# Patient Record
Sex: FEMALE | Race: WHITE | NOT HISPANIC OR LATINO | Employment: STUDENT | URBAN - NONMETROPOLITAN AREA
[De-identification: names, ages, dates, MRNs, and addresses within clinical notes are randomized per-mention and may not be internally consistent; named-entity substitution may affect disease eponyms.]

---
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
## 2018-01-31 ENCOUNTER — APPOINTMENT (OUTPATIENT)
Dept: GENERAL RADIOLOGY | Facility: HOSPITAL | Age: 18
End: 2018-01-31

## 2018-01-31 ENCOUNTER — HOSPITAL ENCOUNTER (EMERGENCY)
Facility: HOSPITAL | Age: 18
Discharge: HOME | End: 2018-02-01
Attending: EMERGENCY MEDICINE | Admitting: EMERGENCY MEDICINE

## 2018-01-31 ENCOUNTER — APPOINTMENT (OUTPATIENT)
Dept: CT IMAGING | Facility: HOSPITAL | Age: 18
End: 2018-01-31

## 2018-01-31 DIAGNOSIS — S46.911A ELBOW STRAIN, RIGHT, INITIAL ENCOUNTER: ICD-10-CM

## 2018-01-31 DIAGNOSIS — M23.92 INTERNAL DERANGEMENT OF BOTH KNEES: Primary | ICD-10-CM

## 2018-01-31 DIAGNOSIS — N30.00 ACUTE CYSTITIS WITHOUT HEMATURIA: ICD-10-CM

## 2018-01-31 DIAGNOSIS — M23.91 INTERNAL DERANGEMENT OF BOTH KNEES: Primary | ICD-10-CM

## 2018-01-31 DIAGNOSIS — S46.912A STRAIN OF LEFT SHOULDER, INITIAL ENCOUNTER: ICD-10-CM

## 2018-01-31 LAB
ALBUMIN SERPL-MCNC: 4.6 G/DL (ref 3.5–5)
ALBUMIN/GLOB SERPL: 1.6 G/DL (ref 1.5–2.5)
ALP SERPL-CCNC: 57 U/L (ref 35–104)
ALT SERPL W P-5'-P-CCNC: 21 U/L (ref 10–36)
AMYLASE SERPL-CCNC: 41 U/L (ref 28–100)
ANION GAP SERPL CALCULATED.3IONS-SCNC: 10 MMOL/L (ref 3.6–11.2)
AST SERPL-CCNC: 24 U/L (ref 10–30)
B-HCG UR QL: NEGATIVE
BASOPHILS # BLD AUTO: 0.03 10*3/MM3 (ref 0–0.3)
BASOPHILS NFR BLD AUTO: 0.3 % (ref 0–2)
BILIRUB SERPL-MCNC: 0.6 MG/DL (ref 0.2–1.8)
BILIRUB UR QL STRIP: ABNORMAL
BUN BLD-MCNC: 9 MG/DL (ref 7–21)
BUN/CREAT SERPL: 13 (ref 7–25)
CALCIUM SPEC-SCNC: 9.7 MG/DL (ref 7.7–10)
CHLORIDE SERPL-SCNC: 104 MMOL/L (ref 99–112)
CLARITY UR: ABNORMAL
CO2 SERPL-SCNC: 25 MMOL/L (ref 24.3–31.9)
COLOR UR: ABNORMAL
CREAT BLD-MCNC: 0.69 MG/DL (ref 0.43–1.29)
DEPRECATED RDW RBC AUTO: 38.8 FL (ref 37–54)
EOSINOPHIL # BLD AUTO: 0.06 10*3/MM3 (ref 0–0.7)
EOSINOPHIL NFR BLD AUTO: 0.6 % (ref 0–5)
ERYTHROCYTE [DISTWIDTH] IN BLOOD BY AUTOMATED COUNT: 12.9 % (ref 11.5–14.5)
GFR SERPL CREATININE-BSD FRML MDRD: 110 ML/MIN/1.73
GLOBULIN UR ELPH-MCNC: 2.8 GM/DL
GLUCOSE BLD-MCNC: 83 MG/DL (ref 70–110)
GLUCOSE UR STRIP-MCNC: NEGATIVE MG/DL
HCT VFR BLD AUTO: 39.8 % (ref 37–47)
HGB BLD-MCNC: 13.6 G/DL (ref 12–16)
HGB UR QL STRIP.AUTO: ABNORMAL
IMM GRANULOCYTES # BLD: 0.01 10*3/MM3 (ref 0–0.03)
IMM GRANULOCYTES NFR BLD: 0.1 % (ref 0–0.5)
KETONES UR QL STRIP: ABNORMAL
LEUKOCYTE ESTERASE UR QL STRIP.AUTO: ABNORMAL
LIPASE SERPL-CCNC: 25 U/L (ref 13–60)
LYMPHOCYTES # BLD AUTO: 2.5 10*3/MM3 (ref 1–3)
LYMPHOCYTES NFR BLD AUTO: 25 % (ref 21–51)
MCH RBC QN AUTO: 28.9 PG (ref 27–33)
MCHC RBC AUTO-ENTMCNC: 34.2 G/DL (ref 33–37)
MCV RBC AUTO: 84.5 FL (ref 80–94)
MONOCYTES # BLD AUTO: 0.77 10*3/MM3 (ref 0.1–0.9)
MONOCYTES NFR BLD AUTO: 7.7 % (ref 0–10)
NEUTROPHILS # BLD AUTO: 6.63 10*3/MM3 (ref 1.4–6.5)
NEUTROPHILS NFR BLD AUTO: 66.3 % (ref 30–70)
NITRITE UR QL STRIP: NEGATIVE
OSMOLALITY SERPL CALC.SUM OF ELEC: 275.4 MOSM/KG (ref 273–305)
PH UR STRIP.AUTO: <=5 [PH] (ref 5–8)
PLATELET # BLD AUTO: 337 10*3/MM3 (ref 130–400)
PMV BLD AUTO: 9.5 FL (ref 6–10)
POTASSIUM BLD-SCNC: 4 MMOL/L (ref 3.5–5.3)
PROT SERPL-MCNC: 7.4 G/DL (ref 6–8)
PROT UR QL STRIP: ABNORMAL
RBC # BLD AUTO: 4.71 10*6/MM3 (ref 4.2–5.4)
SODIUM BLD-SCNC: 139 MMOL/L (ref 135–153)
SP GR UR STRIP: >1.03 (ref 1–1.03)
UROBILINOGEN UR QL STRIP: ABNORMAL
WBC NRBC COR # BLD: 10 10*3/MM3 (ref 4.5–12.5)

## 2018-01-31 PROCEDURE — 81001 URINALYSIS AUTO W/SCOPE: CPT | Performed by: PHYSICIAN ASSISTANT

## 2018-01-31 PROCEDURE — 73080 X-RAY EXAM OF ELBOW: CPT | Performed by: RADIOLOGY

## 2018-01-31 PROCEDURE — 73564 X-RAY EXAM KNEE 4 OR MORE: CPT | Performed by: RADIOLOGY

## 2018-01-31 PROCEDURE — 73030 X-RAY EXAM OF SHOULDER: CPT

## 2018-01-31 PROCEDURE — 73080 X-RAY EXAM OF ELBOW: CPT

## 2018-01-31 PROCEDURE — 87086 URINE CULTURE/COLONY COUNT: CPT | Performed by: PHYSICIAN ASSISTANT

## 2018-01-31 PROCEDURE — 73030 X-RAY EXAM OF SHOULDER: CPT | Performed by: RADIOLOGY

## 2018-01-31 PROCEDURE — 85025 COMPLETE CBC W/AUTO DIFF WBC: CPT | Performed by: PHYSICIAN ASSISTANT

## 2018-01-31 PROCEDURE — 74177 CT ABD & PELVIS W/CONTRAST: CPT | Performed by: RADIOLOGY

## 2018-01-31 PROCEDURE — 81025 URINE PREGNANCY TEST: CPT | Performed by: PHYSICIAN ASSISTANT

## 2018-01-31 PROCEDURE — 82150 ASSAY OF AMYLASE: CPT | Performed by: PHYSICIAN ASSISTANT

## 2018-01-31 PROCEDURE — 73564 X-RAY EXAM KNEE 4 OR MORE: CPT

## 2018-01-31 PROCEDURE — 80053 COMPREHEN METABOLIC PANEL: CPT | Performed by: PHYSICIAN ASSISTANT

## 2018-01-31 PROCEDURE — 99284 EMERGENCY DEPT VISIT MOD MDM: CPT

## 2018-01-31 PROCEDURE — 83690 ASSAY OF LIPASE: CPT | Performed by: PHYSICIAN ASSISTANT

## 2018-01-31 PROCEDURE — 74177 CT ABD & PELVIS W/CONTRAST: CPT

## 2018-01-31 RX ORDER — SODIUM CHLORIDE 0.9 % (FLUSH) 0.9 %
10 SYRINGE (ML) INJECTION AS NEEDED
Status: DISCONTINUED | OUTPATIENT
Start: 2018-01-31 | End: 2018-02-01 | Stop reason: HOSPADM

## 2018-02-01 VITALS
OXYGEN SATURATION: 98 % | BODY MASS INDEX: 23.32 KG/M2 | RESPIRATION RATE: 18 BRPM | TEMPERATURE: 97.5 F | HEART RATE: 74 BPM | SYSTOLIC BLOOD PRESSURE: 115 MMHG | DIASTOLIC BLOOD PRESSURE: 76 MMHG | WEIGHT: 140 LBS | HEIGHT: 65 IN

## 2018-02-01 LAB
AMORPH URATE CRY URNS QL MICRO: ABNORMAL /HPF
BACTERIA UR QL AUTO: ABNORMAL /HPF
HYALINE CASTS UR QL AUTO: ABNORMAL /LPF
MUCOUS THREADS URNS QL MICRO: ABNORMAL /HPF
RBC # UR: ABNORMAL /HPF
REF LAB TEST METHOD: ABNORMAL
SQUAMOUS #/AREA URNS HPF: ABNORMAL /HPF
WBC UR QL AUTO: ABNORMAL /HPF

## 2018-02-01 PROCEDURE — 0 IOPAMIDOL 61 % SOLUTION: Performed by: EMERGENCY MEDICINE

## 2018-02-01 PROCEDURE — 96374 THER/PROPH/DIAG INJ IV PUSH: CPT

## 2018-02-01 PROCEDURE — 25010000002 KETOROLAC TROMETHAMINE PER 15 MG: Performed by: PHYSICIAN ASSISTANT

## 2018-02-01 RX ORDER — KETOROLAC TROMETHAMINE 30 MG/ML
30 INJECTION, SOLUTION INTRAMUSCULAR; INTRAVENOUS ONCE
Status: COMPLETED | OUTPATIENT
Start: 2018-02-01 | End: 2018-02-01

## 2018-02-01 RX ORDER — KETOROLAC TROMETHAMINE 30 MG/ML
60 INJECTION, SOLUTION INTRAMUSCULAR; INTRAVENOUS ONCE
Status: DISCONTINUED | OUTPATIENT
Start: 2018-02-01 | End: 2018-02-01

## 2018-02-01 RX ORDER — SULFAMETHOXAZOLE AND TRIMETHOPRIM 800; 160 MG/1; MG/1
1 TABLET ORAL 2 TIMES DAILY
Qty: 10 TABLET | Refills: 0 | Status: SHIPPED | OUTPATIENT
Start: 2018-02-01 | End: 2018-02-06

## 2018-02-01 RX ORDER — SULFAMETHOXAZOLE AND TRIMETHOPRIM 800; 160 MG/1; MG/1
1 TABLET ORAL ONCE
Status: COMPLETED | OUTPATIENT
Start: 2018-02-01 | End: 2018-02-01

## 2018-02-01 RX ADMIN — KETOROLAC TROMETHAMINE 30 MG: 30 INJECTION, SOLUTION INTRAMUSCULAR; INTRAVENOUS at 01:34

## 2018-02-01 RX ADMIN — IOPAMIDOL 95 ML: 612 INJECTION, SOLUTION INTRAVENOUS at 00:08

## 2018-02-01 RX ADMIN — SULFAMETHOXAZOLE AND TRIMETHOPRIM 160 MG: 800; 160 TABLET ORAL at 01:02
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
## 2018-02-01 NOTE — DISCHARGE INSTRUCTIONS
Please start and finish bactrim. Please see PCP in 3 days. Please use knee immobilizer and crutches as needed as well as rest, ice, elevation and ibuprofen. Please follow up with ortho provided. Please return to ER if symptoms worsen.     Muscle Strain  A muscle strain is an injury that occurs when a muscle is stretched beyond its normal length. Usually a small number of muscle fibers are torn when this happens. Muscle strain is rated in degrees. First-degree strains have the least amount of muscle fiber tearing and pain. Second-degree and third-degree strains have increasingly more tearing and pain.  Usually, recovery from muscle strain takes 1-2 weeks. Complete healing takes 5-6 weeks.  What are the causes?  Muscle strain happens when a sudden, violent force placed on a muscle stretches it too far. This may occur with lifting, sports, or a fall.  What increases the risk?  Muscle strain is especially common in athletes.  What are the signs or symptoms?  At the site of the muscle strain, there may be:  · Pain.  · Bruising.  · Swelling.  · Difficulty using the muscle due to pain or lack of normal function.  How is this diagnosed?  Your health care provider will perform a physical exam and ask about your medical history.  How is this treated?  Often, the best treatment for a muscle strain is resting, icing, and applying cold compresses to the injured area.  Follow these instructions at home:  · Use the PRICE method of treatment to promote muscle healing during the first 2-3 days after your injury. The PRICE method involves:  ¨ Protecting the muscle from being injured again.  ¨ Restricting your activity and resting the injured body part.  ¨ Icing your injury. To do this, put ice in a plastic bag. Place a towel between your skin and the bag. Then, apply the ice and leave it on from 15-20 minutes each hour. After the third day, switch to moist heat packs.  ¨ Apply compression to the injured area with a splint or elastic  bandage. Be careful not to wrap it too tightly. This may interfere with blood circulation or increase swelling.  ¨ Elevate the injured body part above the level of your heart as often as you can.  · Only take over-the-counter or prescription medicines for pain, discomfort, or fever as directed by your health care provider.  · Warming up prior to exercise helps to prevent future muscle strains.  Contact a health care provider if:  · You have increasing pain or swelling in the injured area.  · You have numbness, tingling, or a significant loss of strength in the injured area.  This information is not intended to replace advice given to you by your health care provider. Make sure you discuss any questions you have with your health care provider.  Document Released: 12/18/2006 Document Revised: 05/25/2017 Document Reviewed: 07/17/2014  [employer] Interactive Patient Education © 2017 [employer]      Hypertension  Hypertension, commonly called high blood pressure, is when the force of blood pumping through your arteries is too strong. Your arteries are the blood vessels that carry blood from your heart throughout your body. A blood pressure reading consists of a higher number over a lower number, such as 110/72. The higher number (systolic) is the pressure inside your arteries when your heart pumps. The lower number (diastolic) is the pressure inside your arteries when your heart relaxes. Ideally you want your blood pressure below 120/80.  Hypertension forces your heart to work harder to pump blood. Your arteries may become narrow or stiff. Having untreated or uncontrolled hypertension can cause heart attack, stroke, kidney disease, and other problems.  What increases the risk?  Some risk factors for high blood pressure are controllable. Others are not.  Risk factors you cannot control include:  · Race. You may be at higher risk if you are .  · Age. Risk increases with age.  · Gender. Men are at higher  risk than women before age 45 years. After age 65, women are at higher risk than men.  Risk factors you can control include:  · Not getting enough exercise or physical activity.  · Being overweight.  · Getting too much fat, sugar, calories, or salt in your diet.  · Drinking too much alcohol.  What are the signs or symptoms?  Hypertension does not usually cause signs or symptoms. Extremely high blood pressure (hypertensive crisis) may cause headache, anxiety, shortness of breath, and nosebleed.  How is this diagnosed?  To check if you have hypertension, your health care provider will measure your blood pressure while you are seated, with your arm held at the level of your heart. It should be measured at least twice using the same arm. Certain conditions can cause a difference in blood pressure between your right and left arms. A blood pressure reading that is higher than normal on one occasion does not mean that you need treatment. If it is not clear whether you have high blood pressure, you may be asked to return on a different day to have your blood pressure checked again. Or, you may be asked to monitor your blood pressure at home for 1 or more weeks.  How is this treated?  Treating high blood pressure includes making lifestyle changes and possibly taking medicine. Living a healthy lifestyle can help lower high blood pressure. You may need to change some of your habits.  Lifestyle changes may include:  · Following the DASH diet. This diet is high in fruits, vegetables, and whole grains. It is low in salt, red meat, and added sugars.  · Keep your sodium intake below 2,300 mg per day.  · Getting at least 30-45 minutes of aerobic exercise at least 4 times per week.  · Losing weight if necessary.  · Not smoking.  · Limiting alcoholic beverages.  · Learning ways to reduce stress.  Your health care provider may prescribe medicine if lifestyle changes are not enough to get your blood pressure under control, and if one of  the following is true:  · You are 18-59 years of age and your systolic blood pressure is above 140.  · You are 60 years of age or older, and your systolic blood pressure is above 150.  · Your diastolic blood pressure is above 90.  · You have diabetes, and your systolic blood pressure is over 140 or your diastolic blood pressure is over 90.  · You have kidney disease and your blood pressure is above 140/90.  · You have heart disease and your blood pressure is above 140/90.  Your personal target blood pressure may vary depending on your medical conditions, your age, and other factors.  Follow these instructions at home:  · Have your blood pressure rechecked as directed by your health care provider.  · Take medicines only as directed by your health care provider. Follow the directions carefully. Blood pressure medicines must be taken as prescribed. The medicine does not work as well when you skip doses. Skipping doses also puts you at risk for problems.  · Do not smoke.  · Monitor your blood pressure at home as directed by your health care provider.  Contact a health care provider if:  · You think you are having a reaction to medicines taken.  · You have recurrent headaches or feel dizzy.  · You have swelling in your ankles.  · You have trouble with your vision.  Get help right away if:  · You develop a severe headache or confusion.  · You have unusual weakness, numbness, or feel faint.  · You have severe chest or abdominal pain.  · You vomit repeatedly.  · You have trouble breathing.  This information is not intended to replace advice given to you by your health care provider. Make sure you discuss any questions you have with your health care provider.  Document Released: 12/18/2006 Document Revised: 05/25/2017 Document Reviewed: 10/10/2014  [employer] Interactive Patient Education © 2017 [employer]

## 2018-02-03 LAB — BACTERIA SPEC AEROBE CULT: NORMAL

## 2018-07-06 ENCOUNTER — DOCUMENTATION (OUTPATIENT)
Dept: PSYCHIATRY | Facility: CLINIC | Age: 18
End: 2018-07-06

## 2018-07-06 NOTE — PROGRESS NOTES
DISCHARGE SUMMARY     NAME:     Shanta Cuellar  MEDICAL RECORD NUMBER: [id number]  VISIT NUMBER:     ATTENDING PHYSICIAN:   N/A  THERAPIST:    Megan Mckeon LCSW  YOB: 2000      IDENTIFYING INFORMATION:  Varsha Cuellar is a 17-year-old single, female, college student.        REASON FOR ADMISSION:  Patient was admitted for counseling in September, 2017 after breaking up with her boyfriend.  Patient was distressed, and parents were concerned that she may be suicidal, or might start cutting again as she had in the past.      SUMMARY OF CARE, TREATMENT AND SERVICES PROVIDED:  Patient was not suicidal at any time during the course of counseling.  Nor did she do any self harming behaviors.  Supportive psychotherapy was provided to assist her in grieving the end of her relationship.  We also addressed communication skills and her internal conflicts, fears, and frustration in the developmental task of leaving home and  from parents.      DISCHARGE RECOMMENDATIONS AND REFERRALS:  Patient did not show for her last appointment and did not call to reschedule.  No referrals or recommendations made.  Patient may reapply for treatment as needed.      DISCHARGE MEDICATIONS:  No medications provided by the [facility].      PROGNOSIS:  Good      DISCHARGE DIAGNOSES:      Adjustment Disorder with Mixed Anxiety and Depressed Mood    Patient made good progress letting go of previous relationship.  She continued to struggle at some level with anxiety, and learning to communicate clearly and directly about her feelings, wants, and needs.        Therapist Signature: ________________________________              Date:  7/6/2018      Megan Mckeon LCSW MD Signature: ___________________________                  Date: [unfilled]

## 2019-03-31 ENCOUNTER — HOSPITAL ENCOUNTER (EMERGENCY)
Facility: HOSPITAL | Age: 19
Discharge: HOME | End: 2019-03-31
Attending: FAMILY MEDICINE | Admitting: FAMILY MEDICINE

## 2019-03-31 ENCOUNTER — APPOINTMENT (OUTPATIENT)
Dept: GENERAL RADIOLOGY | Facility: HOSPITAL | Age: 19
End: 2019-03-31

## 2019-03-31 VITALS
HEIGHT: 65 IN | DIASTOLIC BLOOD PRESSURE: 74 MMHG | SYSTOLIC BLOOD PRESSURE: 116 MMHG | OXYGEN SATURATION: 100 % | HEART RATE: 83 BPM | BODY MASS INDEX: 24.49 KG/M2 | WEIGHT: 147 LBS | TEMPERATURE: 98 F | RESPIRATION RATE: 16 BRPM

## 2019-03-31 DIAGNOSIS — J40 BRONCHITIS: Primary | ICD-10-CM

## 2019-03-31 LAB
FLUAV AG NPH QL: NEGATIVE
FLUBV AG NPH QL IA: NEGATIVE
S PYO AG THROAT QL: NEGATIVE

## 2019-03-31 PROCEDURE — 87804 INFLUENZA ASSAY W/OPTIC: CPT | Performed by: NURSE PRACTITIONER

## 2019-03-31 PROCEDURE — 87880 STREP A ASSAY W/OPTIC: CPT | Performed by: NURSE PRACTITIONER

## 2019-03-31 PROCEDURE — 71046 X-RAY EXAM CHEST 2 VIEWS: CPT | Performed by: RADIOLOGY

## 2019-03-31 PROCEDURE — 87081 CULTURE SCREEN ONLY: CPT | Performed by: NURSE PRACTITIONER

## 2019-03-31 PROCEDURE — 71046 X-RAY EXAM CHEST 2 VIEWS: CPT

## 2019-03-31 PROCEDURE — 63710000001 PREDNISONE PER 1 MG: Performed by: NURSE PRACTITIONER

## 2019-03-31 PROCEDURE — 99283 EMERGENCY DEPT VISIT LOW MDM: CPT

## 2019-03-31 RX ORDER — AZITHROMYCIN 250 MG/1
500 TABLET, FILM COATED ORAL ONCE
Status: COMPLETED | OUTPATIENT
Start: 2019-03-31 | End: 2019-03-31

## 2019-03-31 RX ORDER — PREDNISONE 20 MG/1
60 TABLET ORAL ONCE
Status: COMPLETED | OUTPATIENT
Start: 2019-03-31 | End: 2019-03-31

## 2019-03-31 RX ORDER — PREDNISONE 50 MG/1
50 TABLET ORAL DAILY
Qty: 4 TABLET | Refills: 0 | Status: SHIPPED | OUTPATIENT
Start: 2019-03-31 | End: 2019-05-13

## 2019-03-31 RX ORDER — AZITHROMYCIN 500 MG/1
500 TABLET, FILM COATED ORAL DAILY
Qty: 4 TABLET | Refills: 0 | Status: SHIPPED | OUTPATIENT
Start: 2019-03-31 | End: 2019-04-04

## 2019-03-31 RX ADMIN — PREDNISONE 60 MG: 20 TABLET ORAL at 22:56

## 2019-03-31 RX ADMIN — AZITHROMYCIN 500 MG: 250 TABLET, FILM COATED ORAL at 22:56

## 2019-04-01 NOTE — ED PROVIDER NOTES
Subjective     Flu Symptoms   Presenting symptoms: cough, fever and sore throat    Severity:  Moderate  Onset quality:  Sudden  Progression:  Worsening  Chronicity:  New  Relieved by:  None tried  Worsened by:  Nothing  Ineffective treatments:  None tried      Review of Systems   Constitutional: Positive for fever.   HENT: Positive for sore throat.    Respiratory: Positive for cough.    Cardiovascular: Negative.  Negative for chest pain.   Gastrointestinal: Negative.  Negative for abdominal pain.   Endocrine: Negative.    Genitourinary: Negative.  Negative for dysuria.   Skin: Negative.    Neurological: Negative.    Psychiatric/Behavioral: Negative.    All other systems reviewed and are negative.      Past Medical History:   Diagnosis Date   • Anxiety    • IBS (irritable bowel syndrome)        No Known Allergies    Past Surgical History:   Procedure Laterality Date   • ADENOIDECTOMY     • CHOLECYSTECTOMY     • TONSILLECTOMY         History reviewed. No pertinent family history.    Social History     Socioeconomic History   • Marital status: Single     Spouse name: Not on file   • Number of children: Not on file   • Years of education: Not on file   • Highest education level: Not on file   Tobacco Use   • Smoking status: Never Smoker   Substance and Sexual Activity   • Alcohol use: No   • Drug use: No   • Sexual activity: Defer           Objective   Physical Exam   Constitutional: She is oriented to person, place, and time. She appears well-developed and well-nourished. No distress.   HENT:   Head: Normocephalic and atraumatic.   Right Ear: External ear normal.   Left Ear: External ear normal.   Nose: Nose normal.   Eyes: Conjunctivae and EOM are normal. Pupils are equal, round, and reactive to light.   Neck: Normal range of motion. Neck supple. No JVD present. No tracheal deviation present.   Cardiovascular: Normal rate, regular rhythm and normal heart sounds.   No murmur heard.  Pulmonary/Chest: Effort normal and  breath sounds normal. No respiratory distress. She has no wheezes.   Abdominal: Soft. Bowel sounds are normal. There is no tenderness.   Musculoskeletal: Normal range of motion. She exhibits no edema or deformity.   Neurological: She is alert and oriented to person, place, and time. No cranial nerve deficit.   Skin: Skin is warm and dry. No rash noted. She is not diaphoretic. No erythema. No pallor.   Psychiatric: She has a normal mood and affect. Her behavior is normal. Thought content normal.   Nursing note and vitals reviewed.      Procedures           ED Course                  MDM  Number of Diagnoses or Management Options  Bronchitis: new and does not require workup     Amount and/or Complexity of Data Reviewed  Clinical lab tests: reviewed  Tests in the radiology section of CPT®: reviewed          Final diagnoses:   Bronchitis            Hannah Mullins, APRN  04/01/19 0102

## 2019-04-02 LAB — BACTERIA SPEC AEROBE CULT: NORMAL

## 2019-04-11 ENCOUNTER — OFFICE VISIT (OUTPATIENT)
Dept: PSYCHIATRY | Facility: CLINIC | Age: 19
End: 2019-04-11

## 2019-04-11 DIAGNOSIS — F43.23 ADJUSTMENT DISORDER WITH MIXED ANXIETY AND DEPRESSED MOOD: Primary | ICD-10-CM

## 2019-04-11 PROCEDURE — 90834 PSYTX W PT 45 MINUTES: CPT | Performed by: SOCIAL WORKER

## 2019-04-11 NOTE — PROGRESS NOTES
Date of Service: April 12, 2019  Time In:  10:00 AM  Time Out: 10:45 AM      PROGRESS NOTE  Data:  Shanta Cuellar is a 18 y.o. female who met with the undersigned for a regularly scheduled individual outpatient therapy session at the [facility].  Patient is returning for therapy after about 1-1/2 years.  She is currently a sophomore in college, and is struggling with her academic courses.  She reports she is currently on academic probation, and is trying to decide how to proceed.  She would also like to see a nurse practitioner for medication management.     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  She states that she has not done well in the courses, and was even told that she might not be able to return to  if she does not bring her grades up.  Patient is also working 30 hours a week and is carrying 15 hours of course work.  Patient has spoken with an  and a career counselor.  She has learned that she can withdraw from all her classes this semester and it will not affect her GPA.  She has decided to drop the idea of becoming a , and is looking at other departments at  for a new course of study.  At this point her plan is to withdraw from classes and focus on a different major next year.    Patient reports her anxiety and depression have increased over this semester as her academic standing has decreased.  She has stayed up late at night to study, at least once until 4:30 AM.  However she cannot focus and has been unable to retain what she is studying.  She reports she is not motivated to do anything, and has no time to do anything except work and study.  Even when she is not up late studying, she gets about 5 hours sleep, and feels fatigued nearly every day she has difficulty concentrating, and her self-esteem has taken a beating.  Patient reports that she is taking 10 mg of Lexapro  which has helped a little, but not enough.  She is interested in seeing a nurse practitioner at the [facility] for medication management.    Clinical Maneuvering/Intervention:  Assisted patient in processing above session content; acknowledged and normalized patient’s thoughts, feelings, and concerns.  Provided empathy and support as patient processed the above content.  Encouraged patient to follow her plan to withdraw from classes and change her major.  Provided information that many students struggle with the transition from a small town high school to a large educational institution, and that her education is salvageable.  Patient does not want to schedule counseling at this point because she will be staying in [place] for the summer and will continue to work there.      Therapist encouraged patient to reduce work hours and academic load a little when she returns to school in the fall, as she was likely trying to do too much this semester.  Encouraged her to return to counseling if needed.  Therapist will make referral for nurse practitioner.    Allowed patient to freely discuss issues without interruption or judgment. Provided safe, confidential environment to facilitate the development of positive therapeutic relationship and encourage open, honest communication. Assisted patient in identifying risk factors which would indicate the need for higher level of care including thoughts to harm self or others and/or self-harming behavior and encouraged patient to contact this office, call 911, or present to the nearest emergency room should any of these events occur. Discussed crisis intervention services and means to access.  Patient adamantly and convincingly denies current suicidal or homicidal ideation or perceptual disturbance.    Assessment     Diagnoses and all orders for this visit:    Adjustment disorder with mixed anxiety and depressed mood           Mental Status Exam  Hygiene:  good  Dress:   casual  Attitude:  Cooperative  Motor Activity:  Restless  Speech:  Normal  Mood:  anxious  Affect:  anxious  Thought Processes:  Goal directed and Linear  Thought Content:  normal  Suicidal Thoughts:  denies  Homicidal Thoughts:  denies  Crisis Safety Plan: yes, to come to the emergency room.  Hallucinations:  denies    Patient's Support Network Includes:  parents and extended family    Progress toward goal: Not at goal    Functional Status: Mild impairment     Prognosis: Good      Plan   Patient will see nurse tariqer for medication management.  She will call for counseling appointment as needed.    Patient will adhere to medication regimen as prescribed and report any side effects. Patient will contact this office, call 911 or present to the nearest emergency room should suicidal or homicidal ideations occur. Provide Cognitive Behavioral Therapy and Integrative Therapy to improve functioning, maintain stability, and avoid decompensation and the need for higher level of care.          No Follow-up on file.      This document signed by Megan Mckeon LCSW,April 12, 2019 12:28 PM

## 2019-05-13 ENCOUNTER — OFFICE VISIT (OUTPATIENT)
Dept: PSYCHIATRY | Facility: CLINIC | Age: 19
End: 2019-05-13

## 2019-05-13 VITALS
DIASTOLIC BLOOD PRESSURE: 78 MMHG | SYSTOLIC BLOOD PRESSURE: 118 MMHG | HEIGHT: 65 IN | HEART RATE: 76 BPM | WEIGHT: 154.8 LBS | BODY MASS INDEX: 25.79 KG/M2

## 2019-05-13 DIAGNOSIS — F43.22 ADJUSTMENT DISORDER WITH ANXIOUS MOOD: ICD-10-CM

## 2019-05-13 DIAGNOSIS — F33.1 MODERATE EPISODE OF RECURRENT MAJOR DEPRESSIVE DISORDER (HCC): Primary | ICD-10-CM

## 2019-05-13 DIAGNOSIS — Z79.899 MEDICATION MANAGEMENT: ICD-10-CM

## 2019-05-13 LAB
AMPHETAMINE CUT-OFF: NORMAL
BENZODIAZIPINE CUT-OFF: NORMAL
BUPRENORPHINE CUT-OFF: NORMAL
COCAINE CUT-OFF: NORMAL
EXTERNAL AMPHETAMINE SCREEN URINE: NEGATIVE
EXTERNAL BENZODIAZEPINE SCREEN URINE: NEGATIVE
EXTERNAL BUPRENORPHINE SCREEN URINE: NEGATIVE
EXTERNAL COCAINE SCREEN URINE: NEGATIVE
EXTERNAL MDMA: NEGATIVE
EXTERNAL METHADONE SCREEN URINE: NEGATIVE
EXTERNAL METHAMPHETAMINE SCREEN URINE: NEGATIVE
EXTERNAL OPIATES SCREEN URINE: NEGATIVE
EXTERNAL OXYCODONE SCREEN URINE: NEGATIVE
EXTERNAL THC SCREEN URINE: NEGATIVE
MDMA CUT-OFF: NORMAL
METHADONE CUT-OFF: NORMAL
METHAMPHETAMINE CUT-OFF: NORMAL
OPIATES CUT-OFF: NORMAL
OXYCODONE CUT-OFF: NORMAL
THC CUT-OFF: NORMAL

## 2019-05-13 PROCEDURE — 90792 PSYCH DIAG EVAL W/MED SRVCS: CPT | Performed by: NURSE PRACTITIONER

## 2019-05-13 RX ORDER — HYDROXYZINE HYDROCHLORIDE 10 MG/1
10 TABLET, FILM COATED ORAL 3 TIMES DAILY PRN
Qty: 90 TABLET | Refills: 0 | Status: SHIPPED | OUTPATIENT
Start: 2019-05-13 | End: 2019-06-10 | Stop reason: SDUPTHER

## 2019-05-13 RX ORDER — ESCITALOPRAM OXALATE 10 MG/1
TABLET ORAL
Qty: 30 TABLET | Refills: 0 | Status: SHIPPED | OUTPATIENT
Start: 2019-05-13 | End: 2019-06-10

## 2019-05-13 RX ORDER — BUPROPION HYDROCHLORIDE 100 MG/1
100 TABLET, EXTENDED RELEASE ORAL DAILY
Qty: 30 TABLET | Refills: 0 | Status: SHIPPED | OUTPATIENT
Start: 2019-05-13 | End: 2019-06-10

## 2019-05-13 NOTE — PROGRESS NOTES
Subjective   Shanta Cuellar is a 18 y.o. female who is seen me for the first time for medication management after being referred by Geovanna Mckeon LCSW here at the [facility] for medication management.    Chief Complaint: Anxiety/depression    History of Present Illness  Patient presents today seeing me for the first time after she was referred by Geovanna Mckeon LCSW here at the [facility] for therapy.  Patient has been seen by Dr. Maharaj in the past for medication management.  Reports that her depressive and anxiety symptoms started for her roughly around 2014 when she was a freshman in high school.  Patient states that she was having SI the time but was never inpatient.  Patient states that she did come to the partial program which she was in over the summer time.  Patient reports that she has been tried on fluoxetine, sertraline and citalopram.  Patient reports from what she can remember that these medications were ineffective for her.  She states that roughly 2 months ago she was placed on Lexapro by her PCP which she states was somewhat helpful but she ran out and has currently not been on it for 2-3 weeks now.  Patient reports that she saw Mehreen Hernandez LCSW along with Dr. Maharaj in the past but now has been seeing Geovanna Mckeon LCSW here at the [facility] on and off since 2017.  She reports that she transferred to  this past fall in 2018 for school with a major in animal science.  Patient states that she was working 30 hours a week +15 credit hours and her grades were suffering as well as her anxiety and depression were increasing.  Patient stated that she had to drop her classes so her GPA would not significantly decreased.  Patient reports that she is returned home and does not know if she is going to be working or taking summer classes.  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 The patient reports depressive symptoms  including depressed mood, crying spells, feelings of guilt, low energy and difficulty concentrating, and have caused impairment in important areas of functioning.  Depression rated 5/10 with 10 being the worst. The patient reports the following symptoms of anxiety: constant anxiety/worry, restlessness/on edge, difficulty concentrating, mind goes blank and irritability and have caused impairment in important areas of functioning.  Patient states that her anxiety is currently a 6 or 7 on a scale of 0-10 with 10 being the worst.  Patient reports that when she gets stressed that she will cry and has some slight shakiness but otherwise denies any panic attacks.  Patient does report some agitation and irritability and thinks last few months as her depression and anxiety have worsened.  Patient states that she roughly gets at least 8 hours of sleep or more and does not have any issues with sleep.  Patient reports that her appetite has been good.  Patient states that when she first started the Lexapro that she did fine and then roughly a week later she had nausea in which she went to the ER which they told her it might have been a stomach bug but then states that it subsided the next week while on the Lexapro.  Patient states that she ran out roughly 2-3 weeks ago but medication was somewhat helpful for her.  Patient reports that she has had a significant family history of mental illness that she did not know until her grandfather passed away last month on her mother's side and that is when she found out she had 2 cousins that passed away due to suicide and her mother has been seeing a therapist as well as been on medication recently.  Patient denies any OCD hypomanic or manic episodes.  Patient does report problems with decreased concentration and focus as well as forgetfulness.  Patient reports that she thinks she felt out of school as she did not like her major and had a hard time with working and keeping up with classes  "as well and cannot seem to focus well in the classroom.  Patient denies any self-harm.  Patient adamantly denies any SI or HI.  Patient denies any auditory visual hallucinations.        The following portions of the patient's history were reviewed and updated as appropriate: allergies, current medications, past family history, past medical history, past social history, past surgical history and problem list.    Review of Systems   Psychiatric/Behavioral: Positive for agitation, decreased concentration and dysphoric mood. The patient is nervous/anxious.    All other systems reviewed and are negative.      Objective   Physical Exam   Constitutional: She appears well-developed and well-nourished.   Psychiatric: Her speech is normal and behavior is normal. Judgment and thought content normal. Her mood appears anxious. Cognition and memory are normal. She exhibits a depressed mood.   Nursing note and vitals reviewed.    Blood pressure 118/78, pulse 76, height 165.1 cm (65\"), weight 70.2 kg (154 lb 12.8 oz), not currently breastfeeding. Body mass index is 25.76 kg/m².  UDS Reviewed and negative. Unable to obtain Alex as system has changed.     No Known Allergies    Recent Results (from the past 2016 hour(s))   Influenza Antigen, Rapid - Swab, Nasopharynx    Collection Time: 03/31/19  9:16 PM   Result Value Ref Range    Influenza A Ag, EIA Negative Negative    Influenza B Ag, EIA Negative Negative   Rapid Strep A Screen - Swab, Throat    Collection Time: 03/31/19  9:16 PM   Result Value Ref Range    Strep A Ag Negative Negative   Beta Strep Culture, Throat - Swab, Throat    Collection Time: 03/31/19  9:16 PM   Result Value Ref Range    Throat Culture, Beta Strep No Beta Hemolytic Streptococcus Isolated    [facility] Screen    Collection Time: 05/13/19 12:31 PM   Result Value Ref Range    External Amphetamine Screen Urine Negative     Amphetamine Cut-Off 1000mg/ml     External Benzodiazepine Screen Urine Negative     " Benzodiazipine Cut-Off 300mg/ml     External Cocaine Screen Urine Negative     Cocaine Cut-Off 300mg/ml     External THC Screen Urine Negative     THC Cut-Off 50mg/ml     External Methadone Screen Urine Negative     Methadone Cut-Off 300mg/ml     External Methamphetamine Screen Urine Negative     Methamphetamine Cut-Off 1000mg/ml     External Oxycodone Screen Urine Negative     Oxycodone Cut-Off 100mg/ml     External Buprenorphine Screen Urine Negative     Buprenorphine Cut-Off 10mg/ml     External MDMA Negative     MDMA Cut-Off 500mg/ml     External Opiates Screen Urine Negative     Opiates Cut-Off 300mg/ml        Current Medications:   Current Outpatient Medications   Medication Sig Dispense Refill   • buPROPion SR (WELLBUTRIN SR) 100 MG 12 hr tablet Take 1 tablet by mouth Daily. 30 tablet 0   • escitalopram (LEXAPRO) 10 MG tablet Take 1/2 tablet daily for 10 days if no side effects take whole tablet daily. 30 tablet 0   • hydrOXYzine (ATARAX) 10 MG tablet Take 1 tablet by mouth 3 (Three) Times a Day As Needed for Anxiety. 90 tablet 0     No current facility-administered medications for this visit.        Mental Status Exam:   Hygiene:   good  Cooperation:  Cooperative  Eye Contact:  Good  Psychomotor Behavior:  Restless  Affect:  Appropriate  Hopelessness: Denies  Speech:  Normal  Thought Process:  Goal directed and Linear  Thought Content:  Normal  Suicidal:  None  Homicidal:  None  Hallucinations:  None  Delusion:  None  Memory:  Intact  Orientation:  Person, Place, Time and Situation  Reliability:  good  Insight:  Good  Judgement:  Fair  Impulse Control:  Fair  Physical/Medical Issues:  No     Assessment/Plan   Diagnoses and all orders for this visit:    Moderate episode of recurrent major depressive disorder (CMS/HCC)  -     escitalopram (LEXAPRO) 10 MG tablet; Take 1/2 tablet daily for 10 days if no side effects take whole tablet daily.  -     buPROPion SR (WELLBUTRIN SR) 100 MG 12 hr tablet; Take 1 tablet  by mouth Daily.    Adjustment disorder with anxious mood  -     escitalopram (LEXAPRO) 10 MG tablet; Take 1/2 tablet daily for 10 days if no side effects take whole tablet daily.  -     hydrOXYzine (ATARAX) 10 MG tablet; Take 1 tablet by mouth 3 (Three) Times a Day As Needed for Anxiety.    Medication management  -     [facility] Screen        Discused medications options.  Begin Lexapro 5 mg for 10 days if no side effects take 10 mg tablet daily for depression and anxiety.  Begin hydroxyzine 10 mg up to 3 times a day as needed for anxious mood.  Begin Wellbutrin  mg daily as adjunct for depression as well as concentration and focus.  Discussed the risks, beneefits, and side effects of the medications; patient ackowledged and verbally consentedd.  Patient is aware to call the [facility] with any worsening of symptoms.  Patient is agreeable to call 911 or go to the nearest ER should he/she begin having SI/HI. Patient was educated Black Box Warning of increased suicidal thoughts and behaviors with SSRIs.  Instructed patient that if the medication made any symptoms worsen to contact the [facility] or go to the emergency room if any SI or to occur patient verbally agreed and consented.  Patient was strongly encouraged to continue birth control.  Patient was counseled regarding need not to become pregnant prior to discussion and possible titration and discontinuation of medications.  An explanation was provided to the patient regarding the risk of fetal harm with psychotrophic medications.  Patient was provided education regarding both risk of continuing and discontinuing medications during pregnancy.  Patient verbalized understanding.  Encouraged patient to continue with therapy sessions to help with coping skills as well as depressive symptoms.    Patient will follow-up in 4 weeks, informed patient that if she need to be seen sooner to contact the [facility] for an earlier appointment patient  verbally agreed and consented.    CPT was performed to rule out any ADHD as patient had some present symptoms.  CPT test showed some indication with vigilance but otherwise did not indicate any problems with inattentiveness or impulsivity.      Errors in dictation may reflect use of voice recognition software and not all errors in transcription may have been detected prior to signing.

## 2019-06-10 ENCOUNTER — OFFICE VISIT (OUTPATIENT)
Dept: PSYCHIATRY | Facility: CLINIC | Age: 19
End: 2019-06-10

## 2019-06-10 VITALS
BODY MASS INDEX: 27.52 KG/M2 | DIASTOLIC BLOOD PRESSURE: 78 MMHG | HEART RATE: 88 BPM | WEIGHT: 165.2 LBS | SYSTOLIC BLOOD PRESSURE: 115 MMHG | HEIGHT: 65 IN

## 2019-06-10 DIAGNOSIS — F43.22 ADJUSTMENT DISORDER WITH ANXIOUS MOOD: ICD-10-CM

## 2019-06-10 DIAGNOSIS — F33.1 MODERATE EPISODE OF RECURRENT MAJOR DEPRESSIVE DISORDER (HCC): Primary | ICD-10-CM

## 2019-06-10 PROCEDURE — 90833 PSYTX W PT W E/M 30 MIN: CPT | Performed by: NURSE PRACTITIONER

## 2019-06-10 PROCEDURE — 99214 OFFICE O/P EST MOD 30 MIN: CPT | Performed by: NURSE PRACTITIONER

## 2019-06-10 RX ORDER — VENLAFAXINE 37.5 MG/1
37.5 TABLET ORAL DAILY
Qty: 30 TABLET | Refills: 0 | Status: SHIPPED | OUTPATIENT
Start: 2019-06-10 | End: 2019-09-03 | Stop reason: SDUPTHER

## 2019-06-10 RX ORDER — HYDROXYZINE HYDROCHLORIDE 25 MG/1
25 TABLET, FILM COATED ORAL 3 TIMES DAILY PRN
Qty: 90 TABLET | Refills: 0 | Status: SHIPPED | OUTPATIENT
Start: 2019-06-10 | End: 2019-09-26 | Stop reason: SDUPTHER

## 2019-06-10 NOTE — PROGRESS NOTES
Subjective   Shanta Cuellar is a 18 y.o. female who is here today being seen for medication management.    Chief Complaint: Anxiety/depression    History of Present Illness   Patient comes in today late for her appointment reporting that she has not been doing that well.  Patient feels that her depression and anxiety have increased.  Patient states that she has been more on edge and feels worry and jitteriness on the inside since starting the Wellbutrin.  Patient feels that her depression is worse as well as she states that she is having crying episodes with depressed mood and isolating with difficulty concentrating and low energy.  Patient reports that she is still constantly worrying and feeling on edge with occasional agitation as she becomes frustrated easily.  Patient states that she is taking the medication as prescribed but does not feel that it is helpful.  Patient reports that her appetite has significantly increased as she is wanting to isolate more and has noticed that she is eating more often and feels as if she is gaining weight.  Patient has had a 9 pound weight gain since her last visit.  Patient currently rates her depression a 5 out of 10 on a scale of 0-10 with 10 being the worst.  Patient currently rates her anxiety a 7-8 on a scale of 0-10 with 10 being the worst.  Patient recalls that there was an incident where she can find her tweezers and became increasingly frustrated and started crying.  Patient states that she is sleeping roughly 6-7 hours at night she will wake up once during the night but is able to go back to sleep easily and denies any issues with sleep.  Patient states that she is currently working 3 days at [employer] as an  which she states she has done before and is helpful.  Patient reports that she has been more on edge due to her nephews being.  She states when more people are around or certain things tend to set her off and increase her anxiety and  "depression and she needs to remove herself from the situation.  Patient states that she had her nephews along with her parents who are ages 653 and 6 months old at her house for roughly 7 hours which increased her anxiety.  Patient states that she verbalized this to her father but states he was not understanding which frustrated her as well.  Patient states that she plans on attending  in the fall and is looking forward to changing her major.  She reports that she has not attended therapy as she states she does not feel as if she got along well with the therapist but would be willing to try someone else.  Patient denies any new medical concerns.  Patient adamantly denies any SI or HI.  Patient denies any auditory visual hallucinations.      The following portions of the patient's history were reviewed and updated as appropriate: allergies, current medications, past family history, past medical history, past social history, past surgical history and problem list.    Review of Systems   Psychiatric/Behavioral: Positive for agitation and dysphoric mood. Negative for decreased concentration. The patient is nervous/anxious.    All other systems reviewed and are negative.      Objective   Physical Exam   Constitutional: She appears well-developed and well-nourished.   Psychiatric: Her speech is normal. Judgment and thought content normal. Her mood appears anxious. She is agitated. Cognition and memory are normal. She exhibits a depressed mood.   Nursing note and vitals reviewed.    Blood pressure 115/78, pulse 88, height 165.1 cm (65\"), weight 74.9 kg (165 lb 3.2 oz), not currently breastfeeding. Body mass index is 27.49 kg/m².    No Known Allergies    Recent Results (from the past 2016 hour(s))   Influenza Antigen, Rapid - Swab, Nasopharynx    Collection Time: 03/31/19  9:16 PM   Result Value Ref Range    Influenza A Ag, EIA Negative Negative    Influenza B Ag, EIA Negative Negative   Rapid Strep A Screen - Swab, " Throat    Collection Time: 03/31/19  9:16 PM   Result Value Ref Range    Strep A Ag Negative Negative   Beta Strep Culture, Throat - Swab, Throat    Collection Time: 03/31/19  9:16 PM   Result Value Ref Range    Throat Culture, Beta Strep No Beta Hemolytic Streptococcus Isolated    [facility] Screen    Collection Time: 05/13/19 12:31 PM   Result Value Ref Range    External Amphetamine Screen Urine Negative     Amphetamine Cut-Off 1000mg/ml     External Benzodiazepine Screen Urine Negative     Benzodiazipine Cut-Off 300mg/ml     External Cocaine Screen Urine Negative     Cocaine Cut-Off 300mg/ml     External THC Screen Urine Negative     THC Cut-Off 50mg/ml     External Methadone Screen Urine Negative     Methadone Cut-Off 300mg/ml     External Methamphetamine Screen Urine Negative     Methamphetamine Cut-Off 1000mg/ml     External Oxycodone Screen Urine Negative     Oxycodone Cut-Off 100mg/ml     External Buprenorphine Screen Urine Negative     Buprenorphine Cut-Off 10mg/ml     External MDMA Negative     MDMA Cut-Off 500mg/ml     External Opiates Screen Urine Negative     Opiates Cut-Off 300mg/ml        Current Medications:   Current Outpatient Medications   Medication Sig Dispense Refill   • hydrOXYzine (ATARAX) 25 MG tablet Take 1 tablet by mouth 3 (Three) Times a Day As Needed for Anxiety. 90 tablet 0   • venlafaxine (EFFEXOR) 37.5 MG tablet Take 1 tablet by mouth Daily. 30 tablet 0     No current facility-administered medications for this visit.        Mental Status Exam:   Hygiene:   good  Cooperation:  Cooperative  Eye Contact:  Good  Psychomotor Behavior:  Restless  Affect:  Appropriate  Hopelessness: Denies  Speech:  Normal  Thought Process:  Goal directed and Linear  Thought Content:  Normal  Suicidal:  None  Homicidal:  None  Hallucinations:  None  Delusion:  None  Memory:  Intact  Orientation:  Person, Place, Time and Situation  Reliability:  good  Insight:  Good  Judgement:  Fair  Impulse Control:   Fair  Physical/Medical Issues:  No     Assessment/Plan   Diagnoses and all orders for this visit:    Moderate episode of recurrent major depressive disorder (CMS/HCC)  -     venlafaxine (EFFEXOR) 37.5 MG tablet; Take 1 tablet by mouth Daily.    Adjustment disorder with anxious mood  -     hydrOXYzine (ATARAX) 25 MG tablet; Take 1 tablet by mouth 3 (Three) Times a Day As Needed for Anxiety.  -     venlafaxine (EFFEXOR) 37.5 MG tablet; Take 1 tablet by mouth Daily.        I spent a total of  25minutes in direct patient care, greater than 15 minutes (greater than 50%) were spent in coordination of care, and counseling the patient regarding depression and anxiety. Answered any questions patient had with medication and plan.       Discontinue Wellbutrin as it is causing increased anxiety.  Taper off Lexapro since it is ineffective for her informed the patient to take 10 mg for 5 days and then break in half for 5 days and then discontinue.  Begin Effexor 37.5 mg daily for depression and anxiety.  Increase hydroxyzine to 25 mg up to 3 times a day as needed for anxiety informed patient that when her nephews were over to take hydroxyzine to help decrease her anxiety.  Discussed the risks, beneefits, and side effects of the medications; patient ackowledged and verbally consentedd.  Patient is aware to call the [facility] with any worsening of symptoms.  Patient is agreeable to call 911 or go to the nearest ER should he/she begin having SI/HI. Patient was educated Black Box Warning of increased suicidal thoughts and behaviors with SNRIs.  Instructed patient that if the medication made any symptoms worsen to contact the [facility] or go to the emergency room if any SI or to occur patient verbally agreed and consented.  Patient was strongly encouraged to continue birth control.  Patient was counseled regarding need not to become pregnant prior to discussion and possible titration and discontinuation of medications.  An  explanation was provided to the patient regarding the risk of fetal harm with psychotrophic medications.  Patient was provided education regarding both risk of continuing and discontinuing medications during pregnancy.  Patient verbalized understanding.  Encouraged patient to continue with therapy sessions to help with coping skills as well as depressive symptoms as we can schedule her with a new therapist since she did not feel she had a good rapport with the other therapist.    Patient will follow-up in 4 weeks, informed patient that if she need to be seen sooner to contact the [facility] for an earlier appointment patient verbally agreed and consented.  Prognosis: Guarded dependent on medication/follow up and treatment plan compliance.  Functionality: pt having significant impairment in important areas of daily functioning.    10:40am-10:57am 17 minutes spent on SUPPORTIVE PSYCHOTHERAPY: continuing efforts to promote the therapeutic alliance, address the patient’s issues, and strengthen self awareness, insights, and coping skills.  Allow the patient to freely and openly express her symptoms as well as her thoughts regarding her previous therapist.  Patient stated that she is saw her previous therapist Geovanna a couple of times but does not feel that she has a good rapport and wishes to see someone else.  Highly encouraged patient that she needs to continue with therapy and work on coping skills to help decrease her anxiety before school starts back in August as she can continue seeing someone else there as well.  Instructed the patient on deep breathing techniques to help reduce her anxiety.  Encourage the patient to write in a journal regarding her symptoms to help relieve worry as well as be able to express why she is feeling sad or anxious at that moment, patient stated that no one had ever expressed this to her and she would be willing to try.  Encourage patient to get outside and go for daily walks and  become more physically active as she states she is only taking her dog out and going to work.         Errors in dictation may reflect use of voice recognition software and not all errors in transcription may have been detected prior to signing.

## 2019-09-03 DIAGNOSIS — F43.22 ADJUSTMENT DISORDER WITH ANXIOUS MOOD: ICD-10-CM

## 2019-09-03 DIAGNOSIS — F33.1 MODERATE EPISODE OF RECURRENT MAJOR DEPRESSIVE DISORDER (HCC): ICD-10-CM

## 2019-09-03 RX ORDER — VENLAFAXINE 37.5 MG/1
37.5 TABLET ORAL DAILY
Qty: 30 TABLET | Refills: 0 | Status: SHIPPED | OUTPATIENT
Start: 2019-09-03 | End: 2019-09-26 | Stop reason: SDUPTHER

## 2019-09-05 RX ORDER — VENLAFAXINE 37.5 MG/1
TABLET ORAL
Qty: 90 TABLET | Refills: 0 | OUTPATIENT
Start: 2019-09-05

## 2019-09-26 ENCOUNTER — OFFICE VISIT (OUTPATIENT)
Dept: PSYCHIATRY | Facility: CLINIC | Age: 19
End: 2019-09-26

## 2019-09-26 VITALS
HEIGHT: 65 IN | DIASTOLIC BLOOD PRESSURE: 87 MMHG | BODY MASS INDEX: 27.69 KG/M2 | SYSTOLIC BLOOD PRESSURE: 129 MMHG | WEIGHT: 166.2 LBS | HEART RATE: 112 BPM

## 2019-09-26 DIAGNOSIS — F41.1 GENERALIZED ANXIETY DISORDER: ICD-10-CM

## 2019-09-26 DIAGNOSIS — F33.1 MODERATE EPISODE OF RECURRENT MAJOR DEPRESSIVE DISORDER (HCC): Primary | ICD-10-CM

## 2019-09-26 DIAGNOSIS — F41.0 PANIC DISORDER WITHOUT AGORAPHOBIA: ICD-10-CM

## 2019-09-26 PROCEDURE — 90833 PSYTX W PT W E/M 30 MIN: CPT | Performed by: NURSE PRACTITIONER

## 2019-09-26 PROCEDURE — 99214 OFFICE O/P EST MOD 30 MIN: CPT | Performed by: NURSE PRACTITIONER

## 2019-09-26 RX ORDER — HYDROXYZINE HYDROCHLORIDE 25 MG/1
25 TABLET, FILM COATED ORAL 3 TIMES DAILY PRN
Qty: 90 TABLET | Refills: 0 | Status: SHIPPED | OUTPATIENT
Start: 2019-09-26

## 2019-09-26 RX ORDER — VENLAFAXINE 75 MG/1
75 TABLET ORAL 2 TIMES DAILY
Qty: 60 TABLET | Refills: 0 | Status: SHIPPED | OUTPATIENT
Start: 2019-09-26

## 2019-09-26 NOTE — PROGRESS NOTES
Subjective   Shanta Cuellar is a 18 y.o. female who is here today being seen for medication management.    Chief Complaint: Anxiety/depression    History of Present Illness   Patient comes in today to her appointment after not being seen since June. She reports that things have not been going well for her.  Patient states that she has been struggling with her schoolwork as well as her anxiety.  Patient states that she switch classes again after her classes it already started a week ago and started getting behind so she is worried about her grades and is struggling more.  Patient states that she has a hard time focusing in class and feels as if she does not have enough time to finish her work in class.  Patient states that she is able to do the work and when she is at home she is able to concentrate and work is not difficult for her she is just having a difficult time getting it done on time.  Patient reports racing thoughts as well as constant worry and feeling on edge.  Patient states that she did see a school counselor at  as she felt overwhelmed with her classes and the work.  Patient reports that she has had more frequent panic attacks as she states she was feeling short of breath with increased heart rate as well as dizziness and shakiness but stated that she was without the Effexor for almost a week and a half when she started feeling more depressed and anxious.  Patient reports that she had to public speak and got through that but started crying afterwards and had another panic attack after she did the public speaking presentation.  When he struck the patient if she took a hydroxyzine she stated that she did not want to take medication in front of people informed her that she could have took before and she stated that she just forgot but they have been helpful for her.  Patient states that things have been difficult as well as her parents are moving to [place] and she had her wisdom teeth removed.  Patient  "reports that last week she found out a friend that she used to do band with passed away so that is been difficult for her as well.  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  Patient states that she was taking the medication at least for a month or more and then states she was out of for almost a week and a half but then started back again when she got her refill.  Patient reports the medication was helpful for her as she has been back on this week now.  She feels like it did make a difference for her but states that she knows she was not taking it regularly.  Patient reports that her depression and anxiety are currently a 7-8 on a scale of 0-10 with 10 being the worst.  Patient reports that she is sleeping well and denies any issues.  Patient reports her appetite has been good.  Patient denied any side effects to the medications.  Patient adamantly denied any SI or HI.  Patient denies any auditory visual hallucinations.        The following portions of the patient's history were reviewed and updated as appropriate: allergies, current medications, past family history, past medical history, past social history, past surgical history and problem list.    Review of Systems   Psychiatric/Behavioral: Positive for decreased concentration and dysphoric mood. Negative for agitation. The patient is nervous/anxious.    All other systems reviewed and are negative.      Objective   Physical Exam   Constitutional: She appears well-developed and well-nourished.   Psychiatric: Her speech is normal. Judgment and thought content normal. Her mood appears anxious. She is agitated. Cognition and memory are normal. She exhibits a depressed mood.   Nursing note and vitals reviewed.    Blood pressure 129/87, pulse 112, height 165.1 cm (65\"), weight 75.4 kg (166 lb 3.2 oz), not currently breastfeeding. Body mass index is 27.66 " kg/m².    No Known Allergies    No results found for this or any previous visit (from the past 2016 hour(s)).    Current Medications:   Current Outpatient Medications   Medication Sig Dispense Refill   • hydrOXYzine (ATARAX) 25 MG tablet Take 1 tablet by mouth 3 (Three) Times a Day As Needed for Anxiety. 90 tablet 0   • venlafaxine (EFFEXOR) 75 MG tablet Take 1 tablet by mouth 2 (Two) Times a Day. 60 tablet 0     No current facility-administered medications for this visit.        Mental Status Exam:   Hygiene:   good  Cooperation:  Cooperative  Eye Contact:  Good  Psychomotor Behavior:  Restless  Affect:  Appropriate  Hopelessness: Denies  Speech:  Normal  Thought Process:  Goal directed and Linear  Thought Content:  Normal  Suicidal:  None  Homicidal:  None  Hallucinations:  None  Delusion:  None  Memory:  Intact  Orientation:  Person, Place, Time and Situation  Reliability:  good  Insight:  Good  Judgement:  Fair  Impulse Control:  Fair  Physical/Medical Issues:  No     Assessment/Plan   Diagnoses and all orders for this visit:    Moderate episode of recurrent major depressive disorder (CMS/HCC)  -     venlafaxine (EFFEXOR) 75 MG tablet; Take 1 tablet by mouth 2 (Two) Times a Day.    Generalized anxiety disorder  -     hydrOXYzine (ATARAX) 25 MG tablet; Take 1 tablet by mouth 3 (Three) Times a Day As Needed for Anxiety.  -     venlafaxine (EFFEXOR) 75 MG tablet; Take 1 tablet by mouth 2 (Two) Times a Day.    Panic disorder without agoraphobia  -     hydrOXYzine (ATARAX) 25 MG tablet; Take 1 tablet by mouth 3 (Three) Times a Day As Needed for Anxiety.  -     venlafaxine (EFFEXOR) 75 MG tablet; Take 1 tablet by mouth 2 (Two) Times a Day.        I spent a total of  25minutes in direct patient care, greater than 15 minutes (greater than 50%) were spent in coordination of care, and counseling the patient regarding depression and anxiety. Answered any questions patient had with medication and plan.       Since patient  has been on Effexor for over a month and currently restarted back will increase to 75 mg twice a day as patient has had increased anxiety and depression but noticed improvement with the Effexor.  To new hydroxyzine to 25 mg up to 3 times a day as needed for anxiety; highly encouraged patient that before exams or test or any public speaking engagements to take the hydroxyzine to help with anxiety and panic.  Discussed the risks, beneefits, and side effects of the medications; patient ackowledged and verbally consentedd.  Patient is aware to call the [facility] with any worsening of symptoms.  Patient is agreeable to call 911 or go to the nearest ER should he/she begin having SI/HI. Patient was educated Black Box Warning of increased suicidal thoughts and behaviors with SNRIs.  Instructed patient that if the medication made any symptoms worsen to contact the [facility] or go to the emergency room if any SI or to occur patient verbally agreed and consented.  Patient was strongly encouraged to continue birth control.  Patient was counseled regarding need not to become pregnant prior to discussion and possible titration and discontinuation of medications.  An explanation was provided to the patient regarding the risk of fetal harm with psychotrophic medications.  Patient was provided education regarding both risk of continuing and discontinuing medications during pregnancy.  Patient verbalized understanding.  Encouraged patient to continue seeing the school counselor since he is currently at .  Patient reports that she can follow-up in 4 weeks since she has fall break coming up.  Highly encouraged patient the importance of not stopping and starting medication as that can worsen symptoms.  Also encourage the patient that if she had any worsening symptoms to contact the [facility] as we could make medication adjustments patient agreed.    Patient will follow-up in 4 weeks, informed patient that if she need  to be seen sooner to contact the [facility] for an earlier appointment patient verbally agreed and consented.  Prognosis: Guarded dependent on medication/follow up and treatment plan compliance.  Functionality: pt having significant impairment in important areas of daily functioning.    11:45am-12:02 pm 17 minutes spent on SUPPORTIVE PSYCHOTHERAPY: continuing efforts to promote the therapeutic alliance, address the patient’s issues, and strengthen self awareness, insights, and coping skills.  Allow the patient to freely and openly express her symptoms.  Highly encouraged the patient to use the hydroxyzine before any exams or any presentations or big projects that are more anxiety provoking.  Also encourage the patient to write down a list of 3 or 4 items that are most of importance for her class work and to focus on those and getting them done for the day and prioritizing her schedule.  Informed patient to do this throughout the week so she knows what needs to be done first of the most importance to help get her grades up.  Also instructed the patient to contact the 2 classes she is concerned about passing and see if she can do extra work or what work she needs to turn in to ensure that she gets passing grades and let them know that she has been treated for anxiety and depression and trying to get a handle on her grades in order to pass and not decrease her GPA.  Encourage the patient to use the weekends for catching up on all of her work as well as getting ahead to avoid increase in stressors.  Also advised the patient to continue seeing the counselor to help with coping skills.         Errors in dictation may reflect use of voice recognition software and not all errors in transcription may have been detected prior to signing.